# Patient Record
Sex: MALE | Race: WHITE | Employment: FULL TIME | URBAN - METROPOLITAN AREA
[De-identification: names, ages, dates, MRNs, and addresses within clinical notes are randomized per-mention and may not be internally consistent; named-entity substitution may affect disease eponyms.]

---

## 2019-03-05 ENCOUNTER — HOSPITAL ENCOUNTER (OUTPATIENT)
Dept: RESPIRATORY THERAPY | Age: 59
Discharge: HOME | End: 2019-03-05
Attending: FAMILY MEDICINE
Payer: COMMERCIAL

## 2019-03-05 DIAGNOSIS — I50.20 HEART FAILURE, SYSTOLIC (HCC): ICD-10-CM

## 2019-03-05 PROCEDURE — 94726 PLETHYSMOGRAPHY LUNG VOLUMES: CPT

## 2019-03-05 PROCEDURE — 94729 DIFFUSING CAPACITY: CPT

## 2019-03-05 PROCEDURE — 94060 EVALUATION OF WHEEZING: CPT

## 2019-04-02 ENCOUNTER — OFFICE VISIT (OUTPATIENT)
Dept: PULMONOLOGY | Age: 59
End: 2019-04-02

## 2019-04-02 VITALS
HEART RATE: 110 BPM | OXYGEN SATURATION: 95 % | TEMPERATURE: 98.1 F | SYSTOLIC BLOOD PRESSURE: 100 MMHG | BODY MASS INDEX: 23.05 KG/M2 | DIASTOLIC BLOOD PRESSURE: 70 MMHG | RESPIRATION RATE: 20 BRPM | HEIGHT: 69 IN | WEIGHT: 155.6 LBS

## 2019-04-02 DIAGNOSIS — R94.2 ABNORMAL PULMONARY FUNCTION TEST: Primary | ICD-10-CM

## 2019-04-02 RX ORDER — RANITIDINE 150 MG/1
150 TABLET, FILM COATED ORAL
COMMUNITY
Start: 2019-02-14 | End: 2021-04-27

## 2019-04-02 RX ORDER — ATORVASTATIN CALCIUM 40 MG/1
40 TABLET, FILM COATED ORAL
COMMUNITY
Start: 2019-02-14

## 2019-04-02 RX ORDER — LISINOPRIL 10 MG/1
10 TABLET ORAL
COMMUNITY
Start: 2019-02-14

## 2019-04-02 RX ORDER — ALBUTEROL SULFATE 90 UG/1
AEROSOL, METERED RESPIRATORY (INHALATION)
Refills: 5 | COMMUNITY
Start: 2019-02-19 | End: 2021-04-27

## 2019-04-02 RX ORDER — GUAIFENESIN 100 MG/5ML
81 LIQUID (ML) ORAL
COMMUNITY
Start: 2019-02-14

## 2019-04-02 RX ORDER — METOPROLOL SUCCINATE 25 MG/1
25 TABLET, EXTENDED RELEASE ORAL
COMMUNITY
Start: 2019-02-14

## 2019-04-02 NOTE — PATIENT INSTRUCTIONS
Do everything you can to stop smoking. When you want to smoke consider substituting sugarless gum or hard candy. Put the sugarless gum and hard candy in your pockets ashtrays automobile or any place you might consider smoking. Arnuity 1 inhalation daily and remember to exhale fully before inhaling hard and fast and hold your breath for 5-10 seconds.   Also remember to wash mouth with water and spit it out after inhaling    Albuterol 2 inhalations every 4 hours but if you require albuterol too often to control respiratory symptoms call the office for severe symptoms go to the emergency room

## 2019-04-02 NOTE — PROGRESS NOTES
Chief Complaint   Patient presents with    COPD     ABN PFT done 3/5    Cough    Other     1. Have you been to the ER, urgent care clinic since your last visit? Hospitalized since your last visit? Yes Where: [facility]    2. Have you seen or consulted any other health care providers outside of the [address] [place] [address] since your last visit? Include any pap smears or colon screening.  Yes Where: Dr Rubina Cee PCP [facility], Dr Alvarado Pop

## 2019-04-02 NOTE — PROGRESS NOTES
[facility]  Pulmonary, Critical Care, and Sleep Medicine    Dear Dr. Sushma Lopez,    Chief complaint:  Abnormal pulmonary function tests    HPI:  Johana Jane is 66years old and comes to the office today accompanied by his wife at your request concerning abnormal pulmonary function tests which were obtained recently in a patient who was having symptoms of shortness of breath. The patient also relates that he has had this complaint for at least a year and that with any activity such as walking a long distance he becomes dyspneic. He denies any association with chest pain or cough but says he does have a chronic cough which is worse at night when he lays down. He does have chest discomfort at the bottom of his sternum which is been attributed to gastroesophageal reflux and he has this quite frequently despite taking Prilosec. He also denies leg pain or swelling. He also relates that he does wheeze at times and states he has a poor appetite and is lost 50 pounds in the last year. He does use albuterol with questionable results for dyspnea at times  No Known Allergies  Current Outpatient Medications   Medication Sig    PROAIR HFA 90 mcg/actuation inhaler INHALE 1 TO 2 PUFFS PO Q 4 TO 6 H PRN.  aspirin 81 mg chewable tablet Take 81 mg by mouth.  atorvastatin (LIPITOR) 40 mg tablet Take 40 mg by mouth.  lisinopril (PRINIVIL, ZESTRIL) 10 mg tablet Take 10 mg by mouth.  metoprolol succinate (TOPROL-XL) 25 mg XL tablet Take 25 mg by mouth.  raNITIdine (ZANTAC) 150 mg tablet Take 150 mg by mouth. No current facility-administered medications for this visit.       Past Medical History:   Diagnosis Date    GERD (gastroesophageal reflux disease)     High cholesterol     History of heart attack     Hypertension     Non-ischemic cardiomyopathy (HCC)     SOB (shortness of breath)     Systolic heart failure (HCC)     Wheezing    He denies diabetes kidney disease liver disease ulcers asthma or emphysema  History reviewed. No pertinent surgical history. Family history: Cancer       Social History: He has smoked cigarettes sometimes heavily greater than 2 packs a day for 40 years but now is smoking approximately 4 cigarettes a day. He is employed at the [employer] in a [address] where there is some exposure to fumes    Review of systems:  He denies fever or chills he has had one episode of syncope in the last 5 years and denies seizures trouble seeing but reports generalized problems with hearing he also notes trouble with swallowing at times where it is painful to swallow or occasionally the food feels like it gets stuck.   He also denies abdominal pain melena blood in his stools dysuria hematuria rashes but reports arthritis symptoms in his lower back knees    Physical Exam:  Visit Vitals  /70 (BP 1 Location: Left arm, BP Patient Position: Sitting)   Pulse (!) 110   Temp 98.1 °F (36.7 °C) (Oral)   Resp 20   Ht 5' 9\" (1.753 m)   Wt 70.6 kg (155 lb 9.6 oz)   SpO2 95%   BMI 22.98 kg/m²       HEENT: pupils equal, reactive, sclera, non-icteric   Oropharynx tongue: normal   Neck: Supple   Lymph Nodes: Supra clavicular and cervical nodes, negative   Chest: Equal symmetrical expansion, no dullness, no wheezes, rales or rubs there was a loud expiratory rhonchi which cleared with coughing  Heart: Regular tachycardia rhythm without pineda or murmur no carotid bruits  Abdomen: soft, non-tender no masses or organomegaly   Extremities: no cyanosis, clubbing, no edema no calf tenderness  Musculoskeletal: No acute joint inflammation or muscle wasting  Skin: No rash   Neurological: alert, oriented, moves all extremities      LABS:  O2 sat room air at rest 95%  Review of chest x-ray 2/13/19: No significant abnormalities except for an elevated left diaphragm  Pulmonary function test 3/5/19: FEV1 percent 74% of predicted FEF 25-75% 56% of predicted with no improvement with bronchodilator and lung volumes with a vital capacity of 67% of predicted and a diffusion capacity of 49% of predicted  Impression:   Nicotine dependence by history  Chronic obstructive pulmonary disease with chronic bronchitis by history and physical exam.  He also has mild obstructive defect by pulmonary function tests  Also noted is a mild restrictive defect but the cause is not clear as there is no evidence on x-ray of any restrictive disease and there is no clear-cut cause for his diffusion abnormality and less he was having heart failure. A history on the chart of severe heart disease with occlusion of blood vessels and a nonischemic cardiomyopathy as well which certainly can be contributing to his shortness of breath. The most important contributor to his heart and lung disease is cigarette smoking and the patient will be given a strategy to help him stop    Plan:  Arnuity 1 inhalation daily and as needed albuterol will not and other medications and to  Discussed with cardiology the extent of Mr. Romero's heart disease  repeat lung volumes and diffusion  Strategy to help patient stop smoking  Discussion with Dr. Sathya Aly, concerning findings on cardiac cath including severe disease of LAD   consideration for further evaluation for weight loss per Dr. Placido Dsouza      Thanks for allowing me to share in this patient's evaluation    Sincerely,    Jong Joaquin MD , [facility]    CC: Sin Joshua MD    [address]  [place], [address] [address]     P: [phone]     F: [phone]

## 2021-04-27 PROBLEM — C34.90 MALIGNANT NEOPLASM OF LUNG (HCC): Status: ACTIVE | Noted: 2021-04-27

## 2021-04-27 PROBLEM — E78.5 DYSLIPIDEMIA: Status: ACTIVE | Noted: 2021-04-27

## 2021-04-27 PROBLEM — J44.9 CHRONIC OBSTRUCTIVE PULMONARY DISEASE (HCC): Status: ACTIVE | Noted: 2021-04-27

## 2021-04-27 PROBLEM — R06.2 WHEEZING: Status: ACTIVE | Noted: 2021-04-27

## 2021-04-27 PROBLEM — I50.20 HEART FAILURE, SYSTOLIC (HCC): Status: ACTIVE | Noted: 2021-04-27

## 2021-04-27 PROBLEM — Z72.0 TOBACCO ABUSE: Status: ACTIVE | Noted: 2021-04-27

## 2021-04-27 PROBLEM — I25.10 ATHEROSCLEROSIS OF CORONARY ARTERY: Status: ACTIVE | Noted: 2021-04-27

## 2021-04-27 PROBLEM — Z12.11 COLON CANCER SCREENING: Status: ACTIVE | Noted: 2021-04-27

## 2021-04-27 PROBLEM — I11.9 HYPERTENSIVE CARDIOVASCULAR DISEASE: Status: ACTIVE | Noted: 2021-04-27

## 2021-04-27 PROBLEM — F17.200 NICOTINE DEPENDENCE: Status: ACTIVE | Noted: 2021-04-27

## 2021-04-27 PROBLEM — R06.02 SOB (SHORTNESS OF BREATH) ON EXERTION: Status: ACTIVE | Noted: 2021-04-27

## 2021-04-27 PROBLEM — I42.9 CARDIOMYOPATHY (HCC): Status: ACTIVE | Noted: 2021-04-27
